# Patient Record
Sex: FEMALE | Race: WHITE | Employment: OTHER | URBAN - METROPOLITAN AREA
[De-identification: names, ages, dates, MRNs, and addresses within clinical notes are randomized per-mention and may not be internally consistent; named-entity substitution may affect disease eponyms.]

---

## 2017-01-01 ENCOUNTER — ANTI-COAG VISIT (OUTPATIENT)
Dept: CARDIOLOGY CLINIC | Age: 82
End: 2017-01-01

## 2017-01-01 ENCOUNTER — OFFICE VISIT (OUTPATIENT)
Dept: CARDIOLOGY CLINIC | Age: 82
End: 2017-01-01

## 2017-01-01 ENCOUNTER — OFFICE VISIT (OUTPATIENT)
Dept: ORTHOPEDIC SURGERY | Age: 82
End: 2017-01-01

## 2017-01-01 ENCOUNTER — TELEPHONE (OUTPATIENT)
Dept: CARDIOLOGY CLINIC | Age: 82
End: 2017-01-01

## 2017-01-01 ENCOUNTER — TELEPHONE (OUTPATIENT)
Dept: CARDIOLOGY | Age: 82
End: 2017-01-01

## 2017-01-01 VITALS — HEIGHT: 63 IN | WEIGHT: 220.02 LBS | BODY MASS INDEX: 38.98 KG/M2

## 2017-01-01 VITALS
OXYGEN SATURATION: 93 % | DIASTOLIC BLOOD PRESSURE: 44 MMHG | HEART RATE: 48 BPM | SYSTOLIC BLOOD PRESSURE: 167 MMHG | WEIGHT: 211 LBS | BODY MASS INDEX: 37.38 KG/M2

## 2017-01-01 VITALS
HEART RATE: 54 BPM | WEIGHT: 213.8 LBS | DIASTOLIC BLOOD PRESSURE: 72 MMHG | OXYGEN SATURATION: 96 % | BODY MASS INDEX: 37.88 KG/M2 | HEIGHT: 63 IN | SYSTOLIC BLOOD PRESSURE: 128 MMHG

## 2017-01-01 DIAGNOSIS — I10 ESSENTIAL HYPERTENSION: Chronic | ICD-10-CM

## 2017-01-01 DIAGNOSIS — I48.0 PAF (PAROXYSMAL ATRIAL FIBRILLATION) (HCC): ICD-10-CM

## 2017-01-01 DIAGNOSIS — I10 ESSENTIAL HYPERTENSION: ICD-10-CM

## 2017-01-01 DIAGNOSIS — E78.2 MIXED HYPERLIPIDEMIA: ICD-10-CM

## 2017-01-01 DIAGNOSIS — I25.10 CORONARY ARTERY DISEASE INVOLVING NATIVE CORONARY ARTERY OF NATIVE HEART WITHOUT ANGINA PECTORIS: Primary | Chronic | ICD-10-CM

## 2017-01-01 DIAGNOSIS — N18.6 ESRD (END STAGE RENAL DISEASE) (HCC): ICD-10-CM

## 2017-01-01 DIAGNOSIS — I48.0 PAF (PAROXYSMAL ATRIAL FIBRILLATION) (HCC): Primary | ICD-10-CM

## 2017-01-01 DIAGNOSIS — I25.10 CORONARY ARTERY DISEASE INVOLVING NATIVE CORONARY ARTERY OF NATIVE HEART WITHOUT ANGINA PECTORIS: Primary | ICD-10-CM

## 2017-01-01 DIAGNOSIS — M25.551 HIP PAIN, RIGHT: Primary | ICD-10-CM

## 2017-01-01 DIAGNOSIS — R00.2 PALPITATIONS: ICD-10-CM

## 2017-01-01 DIAGNOSIS — I73.9 PVD (PERIPHERAL VASCULAR DISEASE) (HCC): ICD-10-CM

## 2017-01-01 LAB
CHOLESTEROL, TOTAL: 102 MG/DL
CHOLESTEROL/HDL RATIO: 2.5
HDLC SERPL-MCNC: 41 MG/DL (ref 35–70)
INR BLD: 1.1
INR BLD: 1.6
INR BLD: 1.7
INR BLD: 2.1
INR BLD: 2.2
INR BLD: 4
LDL CHOLESTEROL CALCULATED: 44 MG/DL (ref 0–160)
TRIGL SERPL-MCNC: 85 MG/DL
VLDLC SERPL CALC-MCNC: 17 MG/DL

## 2017-01-01 PROCEDURE — G8400 PT W/DXA NO RESULTS DOC: HCPCS | Performed by: PHYSICIAN ASSISTANT

## 2017-01-01 PROCEDURE — G8484 FLU IMMUNIZE NO ADMIN: HCPCS | Performed by: PHYSICIAN ASSISTANT

## 2017-01-01 PROCEDURE — 1123F ACP DISCUSS/DSCN MKR DOCD: CPT | Performed by: INTERNAL MEDICINE

## 2017-01-01 PROCEDURE — G8598 ASA/ANTIPLAT THER USED: HCPCS | Performed by: PHYSICIAN ASSISTANT

## 2017-01-01 PROCEDURE — 1036F TOBACCO NON-USER: CPT | Performed by: PHYSICIAN ASSISTANT

## 2017-01-01 PROCEDURE — 1036F TOBACCO NON-USER: CPT | Performed by: INTERNAL MEDICINE

## 2017-01-01 PROCEDURE — G8400 PT W/DXA NO RESULTS DOC: HCPCS | Performed by: NURSE PRACTITIONER

## 2017-01-01 PROCEDURE — 1123F ACP DISCUSS/DSCN MKR DOCD: CPT | Performed by: PHYSICIAN ASSISTANT

## 2017-01-01 PROCEDURE — G8417 CALC BMI ABV UP PARAM F/U: HCPCS | Performed by: PHYSICIAN ASSISTANT

## 2017-01-01 PROCEDURE — 99213 OFFICE O/P EST LOW 20 MIN: CPT | Performed by: NURSE PRACTITIONER

## 2017-01-01 PROCEDURE — 99213 OFFICE O/P EST LOW 20 MIN: CPT | Performed by: PHYSICIAN ASSISTANT

## 2017-01-01 PROCEDURE — G8417 CALC BMI ABV UP PARAM F/U: HCPCS | Performed by: INTERNAL MEDICINE

## 2017-01-01 PROCEDURE — 99213 OFFICE O/P EST LOW 20 MIN: CPT | Performed by: INTERNAL MEDICINE

## 2017-01-01 PROCEDURE — G8598 ASA/ANTIPLAT THER USED: HCPCS | Performed by: INTERNAL MEDICINE

## 2017-01-01 PROCEDURE — 1090F PRES/ABSN URINE INCON ASSESS: CPT | Performed by: PHYSICIAN ASSISTANT

## 2017-01-01 PROCEDURE — G8427 DOCREV CUR MEDS BY ELIG CLIN: HCPCS | Performed by: NURSE PRACTITIONER

## 2017-01-01 PROCEDURE — 4040F PNEUMOC VAC/ADMIN/RCVD: CPT | Performed by: INTERNAL MEDICINE

## 2017-01-01 PROCEDURE — 4040F PNEUMOC VAC/ADMIN/RCVD: CPT | Performed by: PHYSICIAN ASSISTANT

## 2017-01-01 PROCEDURE — 1090F PRES/ABSN URINE INCON ASSESS: CPT | Performed by: INTERNAL MEDICINE

## 2017-01-01 PROCEDURE — 1111F DSCHRG MED/CURRENT MED MERGE: CPT | Performed by: NURSE PRACTITIONER

## 2017-01-01 PROCEDURE — 4040F PNEUMOC VAC/ADMIN/RCVD: CPT | Performed by: NURSE PRACTITIONER

## 2017-01-01 PROCEDURE — G8484 FLU IMMUNIZE NO ADMIN: HCPCS | Performed by: NURSE PRACTITIONER

## 2017-01-01 PROCEDURE — 1123F ACP DISCUSS/DSCN MKR DOCD: CPT | Performed by: NURSE PRACTITIONER

## 2017-01-01 PROCEDURE — G8427 DOCREV CUR MEDS BY ELIG CLIN: HCPCS | Performed by: PHYSICIAN ASSISTANT

## 2017-01-01 PROCEDURE — 1036F TOBACCO NON-USER: CPT | Performed by: NURSE PRACTITIONER

## 2017-01-01 PROCEDURE — 1111F DSCHRG MED/CURRENT MED MERGE: CPT | Performed by: PHYSICIAN ASSISTANT

## 2017-01-01 PROCEDURE — G8417 CALC BMI ABV UP PARAM F/U: HCPCS | Performed by: NURSE PRACTITIONER

## 2017-01-01 PROCEDURE — 73502 X-RAY EXAM HIP UNI 2-3 VIEWS: CPT | Performed by: PHYSICIAN ASSISTANT

## 2017-01-01 PROCEDURE — 1090F PRES/ABSN URINE INCON ASSESS: CPT | Performed by: NURSE PRACTITIONER

## 2017-01-01 PROCEDURE — G8400 PT W/DXA NO RESULTS DOC: HCPCS | Performed by: INTERNAL MEDICINE

## 2017-01-01 PROCEDURE — G8598 ASA/ANTIPLAT THER USED: HCPCS | Performed by: NURSE PRACTITIONER

## 2017-01-01 PROCEDURE — E0135 WALKER FOLDING ADJUST/FIXED: HCPCS | Performed by: PHYSICIAN ASSISTANT

## 2017-01-01 PROCEDURE — G8427 DOCREV CUR MEDS BY ELIG CLIN: HCPCS | Performed by: INTERNAL MEDICINE

## 2017-01-01 RX ORDER — CLOPIDOGREL BISULFATE 75 MG/1
75 TABLET ORAL DAILY
Qty: 30 TABLET | Refills: 0
Start: 2017-01-01 | End: 2017-01-01 | Stop reason: ALTCHOICE

## 2017-07-02 PROBLEM — R10.9 ABDOMINAL PAIN: Status: ACTIVE | Noted: 2017-01-01

## 2017-07-02 PROBLEM — K92.1 HEMATOCHEZIA: Status: ACTIVE | Noted: 2017-01-01

## 2017-07-02 PROBLEM — Z87.891 FORMER SMOKER: Chronic | Status: ACTIVE | Noted: 2017-01-01

## 2017-07-02 PROBLEM — A04.72 C. DIFFICILE COLITIS: Status: ACTIVE | Noted: 2017-01-01

## 2017-07-02 PROBLEM — R74.8 ELEVATED LIPASE: Status: ACTIVE | Noted: 2017-01-01

## 2017-07-02 PROBLEM — R16.1 SPLENIC MASS: Status: ACTIVE | Noted: 2017-01-01

## 2017-07-02 PROBLEM — I73.9 PVD (PERIPHERAL VASCULAR DISEASE) (HCC): Chronic | Status: ACTIVE | Noted: 2017-01-01

## 2017-09-21 PROBLEM — R00.2 PALPITATIONS: Status: ACTIVE | Noted: 2017-01-01

## 2017-10-30 PROBLEM — R07.9 CHEST PAIN: Status: ACTIVE | Noted: 2017-01-01

## 2017-11-07 NOTE — PROGRESS NOTES
11/07/17: INR is 1.7. Per protocol, dosing change is 7.5 mg today then resume 5 mg daily. Recheck 1 week(s).  Provider notified. ~ Benedict Burgess RN

## 2017-11-07 NOTE — TELEPHONE ENCOUNTER
Notified patient of this. She said her PCP wanted to know if she needs to stay on Plavix, warfarin and ASA?

## 2017-11-07 NOTE — TELEPHONE ENCOUNTER
Please call pt. CONFIRM SHE IS TAKING 5 MG DAILY. INR is 1.7. Per protocol, dosing change is 7.5 mg today then resume 5 mg daily. Recheck 1 week(s).  Provider notified. ~ Leyda Goodwin RN

## 2017-11-14 NOTE — PATIENT INSTRUCTIONS
1. Stop the Plavix, last dose on 12/3/17  2. Continue the baby aspirin and warfarin (Coumadin) indefinitely  3. No change in other heart medicines  4.  Follow up with Dr. Brittani Hdz in 3 months

## 2017-11-14 NOTE — PROGRESS NOTES
[facility]   Cardiac Evaluation    Primary Care Doctor:  Parveen Garcia MD    Chief Complaint   Patient presents with    Follow-Up from [address] [place] [address]     10/30/17 chest pain, 11/5/17 palpitaions, 11/13/17 palpitations    Edema     Lt leg    Discuss Medications     on 3 blood thinners. cut chin on 11/12/17 could not stop.  went to ED        History of Present Illness:   I had the pleasure of seeing Eyal Soliz in follow up for recent hospitalization. Eyal Soliz presented with chest pain and racing palpitations after dialysis. Cardiac troponin's elevated but unclear if related to ESRD. Stress test abnormal leading to left heart catheterization and PTCA of LAD. It was noted she has had PAF in the past though not evident during this admission. Given her high risk of CVA in AFib, anticoagulation with warfarin was recommended. This was initiated and will be followed at  to be called to our office for management. She has a hx of CAD and STEMI in August 2015 with PCI (BMS) to Diagonal artery, PAD with PTA's per Dr. Giovani Henriquez, HTN, HLD, ESRD on HD, DM2, anemia. Since discharge she has been in the ED 3 times for palpitations similar to original presentation though troponin's were down trending, chin laceration/ bleeding and lightheadedness/ weakness following dialysis which improved with IV fluids. Today she is doing fair. She still has lots of bruising to LLE but no hematoma, still sore. She denies any shortness of breath or chest pain. She does not tolerate dialysis very well, has more nausea. Her BP starts off high but then drops. Dr. Sadia Schumacher is her usual neprhologist.  She denies any palpitations since being on the bid metoprolol (was discharged with once daily but corrected during ED visit on 11/5/17). Her sleep is poor due to GI issues (diarrhea). Her appetite is only fair, eating less. She had constipation after discharge for about 3 days, better since. Jannice Prader describes symptoms including dyspnea, fatigue, edema but denies chest pain, palpitations, orthopnea, PND, early saiety, syncope. Past Medical History:   has a past medical history of Asthma; CAD (coronary artery disease); Chronic kidney disease (CKD) stage G4/A1, severely decreased glomerular filtration rate (GFR) between 15-29 mL/min/1.73 square meter and albuminuria creatinine ratio less than 30 mg/g ([facility] Diabetes mellitus ([facility] Glaucoma; Hypertension; Hypothyroid; Localized osteoarthrosis not specified whether primary or secondary, lower leg; Mixed hyperlipidemia; Pneumonia; Pneumonia; Psychiatric problem; Skin cancer; Trigger finger; and Unspecified diseases of blood and blood-forming organs. Surgical History:   has a past surgical history that includes Hemorrhoid surgery; Breast surgery; skin biopsy; Tonsillectomy; Endoscopy, colon, diagnostic; Colonoscopy; vascular surgery (Bilateral); eye surgery (Bilateral); knee surgery (Left, 4/16/13); Finger trigger release (Left); Cardiac catheterization (08/03/2015); Hammer toe surgery (Left); Colonoscopy (08/04/2016); and Coronary angioplasty with stent. Social History:   reports that she quit smoking about 20 years ago. She has a 30.00 pack-year smoking history. She has never used smokeless tobacco. She reports that she does not drink alcohol or use drugs. Family History:   Family History   Problem Relation Age of Onset    Arthritis Mother     Cancer Mother     High Blood Pressure Father     High Cholesterol Father     Diabetes Maternal Grandmother        Home Medications:  Prior to Admission medications    Medication Sig Start Date End Date Taking?  Authorizing Provider   metoprolol tartrate (LOPRESSOR) 50 MG tablet Take 1 tablet by mouth 2 times daily Change your dose to twice daily instead of daily 11/5/17  Yes Pino Brink MD   warfarin (COUMADIN) 5 MG tablet Take 1 tablet by mouth daily Recheck INR at dialysis on Monday 11/6/17. 11/3/17  Yes Paul Goodson NP   atorvastatin (LIPITOR) 80 MG tablet Take 1 tablet by mouth daily 11/3/17  Yes Paul Goodson NP   clopidogrel (PLAVIX) 75 MG tablet Take 1 tablet by mouth daily 11/3/17  Yes Paul Goodson NP   hyoscyamine  MCG TBCR extended release tablet Take by mouth every 4 hours as needed   Yes Historical Provider, MD   calcium carbonate (TUMS) 500 MG chewable tablet Take 1 tablet by mouth See Admin Instructions With each meal   Yes Historical Provider, MD   losartan (COZAAR) 25 MG tablet Take 25 mg by mouth nightly   Yes Historical Provider, MD   polyethylene glycol (MIRALAX) powder Take 17 g by mouth 2 times daily as needed (constipation) 9/8/16  Yes Maya Marsk MD   doxazosin (CARDURA) 2 MG tablet Take 4 mg by mouth nightly  9/23/15  Yes Historical Provider, MD   LEVEMIR 100 UNIT/ML injection vial 40 Units nightly  9/25/15  Yes Historical Provider, MD   B Complex-C-Folic Acid (REINA CAPS) 1 MG CAPS 1 capsule daily  9/7/15  Yes Historical Provider, MD   sevelamer (RENVELA) 0.8 G PACK packet Take 0.8 g by mouth 3 times daily (with meals) 8/6/15  Yes Sultana Mullins MD   furosemide (LASIX) 40 MG tablet Take 40 mg by mouth See Admin Instructions Take once daily on non-dialysis days   Yes Historical Provider, MD   clonidine (CATAPRES-TTS-3) 0.3 MG/24HR Place 1 patch onto the skin once a week thursdays   Yes Historical Provider, MD   aspirin 81 MG chewable tablet Take 81 mg by mouth daily. Yes Historical Provider, MD   brimonidine (ALPHAGAN P) 0.15 % ophthalmic solution   Place 1 drop into both eyes 2 times daily    Yes Historical Provider, MD   levothyroxine (SYNTHROID) 125 MCG tablet Take 125 mcg by mouth daily. Yes Historical Provider, MD        Allergies:  Acyclovir     Review of Systems:   · Constitutional: there has been no unanticipated weight loss. · Eyes: No vision changes  · ENT: No Headaches, no nasal congestion.  No mouth sores or sore arteriovenous groove. There is mild calcification throughout the left  circumflex and obtuse marginal.  There is moderate luminal irregularities  with 5%  to 10% stenosis. 5.  The right coronary artery is dominant. It has luminal calcifications as  well. It has luminal disease with stenosis less than 10%. 6.  Left ventricular end-diastolic pressure was 30.    7.  Left ventricular ejection fraction was not done due to significantly  elevated left ventricular end-diastolic pressure and we will obtain  echocardiogram.     PLAN:     1. Successful percutaneous intervention to a large diagonal 2 with a  proximal 100% lesion successfully treated with a bare metal stent. Residual  stenosis less than 10% with restoration of GASPER 3 flow. 2.  Significant peripheral arterial disease, as well as angiographic  calcification of all vessels between the coronary arteries, aorta, and  visualized portions of the left iliac, femoral, SFA, and profunda arteries. This is in line with patients end-stage renal disease. 3.  There is a previously-placed stent that is present in the proximal SFA. This is not completely visualized today but does show a proximal portion with  a 20% narrowing. 4.  Will continue patient on ReoPro for the next 12 hours. Continue Plavix  75 mg p.o. daily for 4 weeks without interruption, ideally for 1 year  following stenting procedure. 5.  Will need aggressive risk factor reduction and medical management. Will  need aggressive calcium control by Nephrology. Assessment:    1. Coronary artery disease involving native coronary artery of native heart without angina pectoris    2. PAF (paroxysmal atrial fibrillation) ([address]    3. Mixed hyperlipidemia    4. ESRD on HD MWF          Plan:   1. Stop the Plavix, last dose on 12/3/17  2. Continue the baby aspirin and warfarin (Coumadin) indefinitely  3. No change in other heart medicines  4.  Follow up with Dr. Juan M Van in 3 months       I appreciate the opportunity of cooperating in the care of this individual.    Reyna Mello CNP, 11/14/2017, 3:04 PM    QUALITY MEASURES  1. Tobacco Cessation Counseling: NA  2. Retake of BP if >140/90:   NA  3. Documentation to PCP/referring for new patient:  Sent to PCP at close of office visit  4. CAD patient on anti-platelet: Yes  5. CAD patient on STATIN therapy:  Yes  6.  Patient with CHF and aFib on anticoagulation:  Yes

## 2017-11-15 NOTE — PROGRESS NOTES
11/15/17: INR is 4.0. Per protocol, dosing change is hold today only. New regimen is 2.5 mg Sat and 5 mg all other days. Recheck 1 week(s).  Provider notified. ~ Jared Murdock RN

## 2017-11-15 NOTE — TELEPHONE ENCOUNTER
Ms Davion Rivas notified of the INR results and change in her Coumadin dose. She voiced her understanding.

## 2017-11-15 NOTE — TELEPHONE ENCOUNTER
Created telephone encounter. Per pt HIPAA form can not leave test results on machine. [facility] requesting pt to call the office for test results. Will relay INR results and instructions once pt calls back.

## 2017-11-17 PROBLEM — I50.32 CHRONIC DIASTOLIC CHF (CONGESTIVE HEART FAILURE) (HCC): Chronic | Status: ACTIVE | Noted: 2017-01-01

## 2017-11-17 PROBLEM — R16.1 SPLENIC MASS: Status: RESOLVED | Noted: 2017-01-01 | Resolved: 2017-01-01

## 2017-11-17 PROBLEM — K57.90 DIVERTICULOSIS: Chronic | Status: ACTIVE | Noted: 2017-01-01

## 2017-11-17 PROBLEM — R00.2 PALPITATIONS: Status: RESOLVED | Noted: 2017-01-01 | Resolved: 2017-01-01

## 2017-11-17 PROBLEM — R74.8 ELEVATED LIPASE: Status: RESOLVED | Noted: 2017-01-01 | Resolved: 2017-01-01

## 2017-11-17 PROBLEM — E66.9 OBESITY (BMI 30-39.9): Chronic | Status: ACTIVE | Noted: 2017-01-01

## 2017-11-17 PROBLEM — R07.9 CHEST PAIN: Status: RESOLVED | Noted: 2017-01-01 | Resolved: 2017-01-01

## 2017-11-17 PROBLEM — R19.7 DIARRHEA: Status: ACTIVE | Noted: 2017-01-01

## 2017-11-17 PROBLEM — A04.72 C. DIFFICILE COLITIS: Chronic | Status: ACTIVE | Noted: 2017-01-01

## 2017-11-17 PROBLEM — Z79.01 ANTICOAGULATED ON COUMADIN: Chronic | Status: ACTIVE | Noted: 2017-01-01

## 2017-11-17 PROBLEM — R10.9 ABDOMINAL PAIN: Status: RESOLVED | Noted: 2017-01-01 | Resolved: 2017-01-01

## 2017-11-20 NOTE — TELEPHONE ENCOUNTER
Please call the patient and schedule a 2-3 week HSFU with Rosette Kaiser CNP per Dr. Han Lopez. Thank you.

## 2017-11-29 NOTE — TELEPHONE ENCOUNTER
Per HIPAA form, can not leave a detailed message. [facility] requesting a call to the office.

## 2017-11-29 NOTE — TELEPHONE ENCOUNTER
Please call pt. INR is 1.1. Per discharge instructions on 11/20, she is to hold coumadin for 2 weeks due to lower GI bleed. She will restart coumadin on 12/04 at 5 mg daily. Recheck 12/08.  Provider notified. ~ Juan Greer RN     INR drawn at [facility]

## 2017-12-01 NOTE — PROGRESS NOTES
does have some mild to moderate groin pain. No significant buttocks pain. Range of Motion:  Range of motion of the hip is limited mostly with hip flexion and internal rotation. Tenderness present with logroll. Strength:  5 over 5 strength with hip flexion and extension     Special Tests:  Positive Erica's test.  Negative log roll maneuver. Negative Homans test.    Skin: There are no rashes, ulcerations or lesions. Gait: Slightly antalgic gait favoring the unaffected side. Reflex 2+ patellar    Additional Comments:   Decreased sensation of bilateral lower extremity is consistent with neuropathy. +1 pulses present. Additional Examinations:         Contralateral Exam: Examination of the left  hip reveals intact skin. The patient demonstrates full painless range of motion with regards to flexion, abduction, internal and external rotation. There is no tenderness about the greater trochanter. There is a negative straight leg raise against resistance. Strength is 5/5 throughout all planes. Lower Back: Examination of the lower back does not show any tenderness, deformity or injury. Range of motion is unremarkable. There is no gross instability. There are no rashes, ulcerations or lesions. Strength and tone are normal.    Radiology:     X-rays obtained and reviewed in office:  Views 2 views including AP pelvis and lateral  Location right  hip  Impression no fractures or malalignment identified. The femoral acetabular joint space appears well maintained. Significant arteriosclerosis are seen. Significant lumbar degenerative changes seen. Impression:  Encounter Diagnosis   Name Primary?     Hip pain, right Yes       Office Procedures:  Orders Placed This Encounter   Procedures    Hip 2-3 Vw W Pelvis Right    MRI Hip Right WO Contrast     Standing Status:   Future     Standing Expiration Date:   12/1/2018     Scheduling Instructions:      TBS @ [address] [place] [address]      [phone]

## 2017-12-12 NOTE — TELEPHONE ENCOUNTER
Attempted to call pt. No answer and no VM. INR is 2.2. Per protocol, no changes. Continue 2.5 mg Sat and 5 mg all other days. Recheck 1 week(s).  Provider notified. ~ Mathieu Aguero RN     INR drawn at [facility]

## 2017-12-12 NOTE — PROGRESS NOTES
12/12/17: INR is 2.2. Per protocol, no changes. Continue 2.5 mg Sat and 5 mg all other days. Recheck 1 week(s).  Provider notified. ~ Marisela Park RN     INR drawn at [facility]

## 2017-12-26 PROBLEM — I21.4 NSTEMI (NON-ST ELEVATED MYOCARDIAL INFARCTION) (HCC): Status: ACTIVE | Noted: 2017-01-01

## 2018-01-01 ENCOUNTER — TELEPHONE (OUTPATIENT)
Dept: CARDIOLOGY CLINIC | Age: 83
End: 2018-01-01

## 2018-01-01 ENCOUNTER — OFFICE VISIT (OUTPATIENT)
Dept: CARDIOLOGY CLINIC | Age: 83
End: 2018-01-01

## 2018-01-01 ENCOUNTER — NURSE ONLY (OUTPATIENT)
Dept: CARDIOLOGY CLINIC | Age: 83
End: 2018-01-01

## 2018-01-01 VITALS
WEIGHT: 200.8 LBS | SYSTOLIC BLOOD PRESSURE: 158 MMHG | DIASTOLIC BLOOD PRESSURE: 47 MMHG | HEIGHT: 63 IN | BODY MASS INDEX: 35.58 KG/M2 | HEART RATE: 61 BPM | OXYGEN SATURATION: 99 %

## 2018-01-01 VITALS
BODY MASS INDEX: 36.14 KG/M2 | SYSTOLIC BLOOD PRESSURE: 110 MMHG | DIASTOLIC BLOOD PRESSURE: 80 MMHG | OXYGEN SATURATION: 95 % | HEIGHT: 63 IN | WEIGHT: 204 LBS | HEART RATE: 43 BPM

## 2018-01-01 DIAGNOSIS — I44.0 FIRST DEGREE AV BLOCK: ICD-10-CM

## 2018-01-01 DIAGNOSIS — I48.0 PAF (PAROXYSMAL ATRIAL FIBRILLATION) (HCC): Chronic | ICD-10-CM

## 2018-01-01 DIAGNOSIS — N18.6 ESRD (END STAGE RENAL DISEASE) (HCC): Chronic | ICD-10-CM

## 2018-01-01 DIAGNOSIS — I25.10 CORONARY ARTERY DISEASE INVOLVING NATIVE CORONARY ARTERY OF NATIVE HEART WITHOUT ANGINA PECTORIS: Chronic | ICD-10-CM

## 2018-01-01 DIAGNOSIS — I50.21 ACUTE SYSTOLIC CONGESTIVE HEART FAILURE (HCC): ICD-10-CM

## 2018-01-01 DIAGNOSIS — I73.9 PAD (PERIPHERAL ARTERY DISEASE) (HCC): ICD-10-CM

## 2018-01-01 DIAGNOSIS — I25.10 CORONARY ARTERY DISEASE INVOLVING NATIVE CORONARY ARTERY OF NATIVE HEART WITHOUT ANGINA PECTORIS: Primary | Chronic | ICD-10-CM

## 2018-01-01 DIAGNOSIS — I21.4 NSTEMI (NON-ST ELEVATED MYOCARDIAL INFARCTION) (HCC): ICD-10-CM

## 2018-01-01 DIAGNOSIS — I48.0 PAF (PAROXYSMAL ATRIAL FIBRILLATION) (HCC): ICD-10-CM

## 2018-01-01 DIAGNOSIS — E78.2 MIXED HYPERLIPIDEMIA: Chronic | ICD-10-CM

## 2018-01-01 DIAGNOSIS — I44.1 AV BLOCK, MOBITZ 1: Primary | ICD-10-CM

## 2018-01-01 DIAGNOSIS — R00.1 BRADYCARDIA: Primary | ICD-10-CM

## 2018-01-01 DIAGNOSIS — R53.83 FATIGUE, UNSPECIFIED TYPE: ICD-10-CM

## 2018-01-01 DIAGNOSIS — I50.32 CHRONIC DIASTOLIC CHF (CONGESTIVE HEART FAILURE) (HCC): Chronic | ICD-10-CM

## 2018-01-01 DIAGNOSIS — I50.21 ACUTE SYSTOLIC CONGESTIVE HEART FAILURE (HCC): Primary | ICD-10-CM

## 2018-01-01 DIAGNOSIS — I15.0 RENOVASCULAR HYPERTENSION: Primary | Chronic | ICD-10-CM

## 2018-01-01 LAB
BASOPHILS ABSOLUTE: 0.1 /ΜL
BASOPHILS RELATIVE PERCENT: 0.7 %
EOSINOPHILS ABSOLUTE: 0.1 /ΜL
EOSINOPHILS RELATIVE PERCENT: 1 %
HCT VFR BLD CALC: 29.5 % (ref 36–46)
HEMOGLOBIN: 9.8 G/DL (ref 12–16)
LYMPHOCYTES ABSOLUTE: 1.2 /ΜL
LYMPHOCYTES RELATIVE PERCENT: 16 %
MCH RBC QN AUTO: 29.3 PG
MCHC RBC AUTO-ENTMCNC: 33.3 G/DL
MCV RBC AUTO: 88.2 FL
MONOCYTES ABSOLUTE: 0.5 /ΜL
MONOCYTES RELATIVE PERCENT: 7.4 %
NEUTROPHILS ABSOLUTE: 5.4 /ΜL
NEUTROPHILS RELATIVE PERCENT: 74.9 %
PLATELET # BLD: 232 K/ΜL
PMV BLD AUTO: 8.8 FL
POTASSIUM (K+): 5.2
POTASSIUM (K+): 6
RBC # BLD: 3.35 10^6/ΜL
WBC # BLD: 7.2 10^3/ML

## 2018-01-01 PROCEDURE — G8598 ASA/ANTIPLAT THER USED: HCPCS | Performed by: INTERNAL MEDICINE

## 2018-01-01 PROCEDURE — 1036F TOBACCO NON-USER: CPT | Performed by: INTERNAL MEDICINE

## 2018-01-01 PROCEDURE — 4040F PNEUMOC VAC/ADMIN/RCVD: CPT | Performed by: INTERNAL MEDICINE

## 2018-01-01 PROCEDURE — 99215 OFFICE O/P EST HI 40 MIN: CPT | Performed by: INTERNAL MEDICINE

## 2018-01-01 PROCEDURE — 99214 OFFICE O/P EST MOD 30 MIN: CPT | Performed by: INTERNAL MEDICINE

## 2018-01-01 PROCEDURE — G8427 DOCREV CUR MEDS BY ELIG CLIN: HCPCS | Performed by: INTERNAL MEDICINE

## 2018-01-01 PROCEDURE — 1111F DSCHRG MED/CURRENT MED MERGE: CPT | Performed by: INTERNAL MEDICINE

## 2018-01-01 PROCEDURE — G8400 PT W/DXA NO RESULTS DOC: HCPCS | Performed by: INTERNAL MEDICINE

## 2018-01-01 PROCEDURE — 93000 ELECTROCARDIOGRAM COMPLETE: CPT | Performed by: INTERNAL MEDICINE

## 2018-01-01 PROCEDURE — 1123F ACP DISCUSS/DSCN MKR DOCD: CPT | Performed by: INTERNAL MEDICINE

## 2018-01-01 PROCEDURE — 93225 XTRNL ECG REC<48 HRS REC: CPT | Performed by: INTERNAL MEDICINE

## 2018-01-01 PROCEDURE — 1090F PRES/ABSN URINE INCON ASSESS: CPT | Performed by: INTERNAL MEDICINE

## 2018-01-01 PROCEDURE — G8417 CALC BMI ABV UP PARAM F/U: HCPCS | Performed by: INTERNAL MEDICINE

## 2018-01-01 PROCEDURE — 93227 XTRNL ECG REC<48 HR R&I: CPT | Performed by: INTERNAL MEDICINE

## 2018-01-01 PROCEDURE — G8484 FLU IMMUNIZE NO ADMIN: HCPCS | Performed by: INTERNAL MEDICINE

## 2018-01-01 RX ORDER — OXYCODONE HYDROCHLORIDE AND ACETAMINOPHEN 5; 325 MG/1; MG/1
1 TABLET ORAL EVERY 8 HOURS PRN
COMMUNITY

## 2018-01-01 RX ORDER — LOSARTAN POTASSIUM 100 MG/1
100 TABLET ORAL DAILY
Qty: 30 TABLET | Refills: 2 | Status: SHIPPED | OUTPATIENT
Start: 2018-01-01

## 2018-01-01 RX ORDER — VANCOMYCIN HYDROCHLORIDE 125 MG/1
CAPSULE ORAL
COMMUNITY
Start: 2018-01-01

## 2018-01-01 RX ORDER — CHOLESTYRAMINE 4 G/9G
POWDER, FOR SUSPENSION ORAL
Refills: 1 | COMMUNITY
Start: 2018-01-01 | End: 2018-01-01

## 2018-01-01 RX ORDER — HYOSCYAMINE SULFATE 0.125 MG
125 TABLET ORAL
COMMUNITY

## 2018-01-24 NOTE — TELEPHONE ENCOUNTER
I would first try to increase the losartan from 50 mg to 100 mg daily.    Thanks, [employer]

## 2018-02-01 NOTE — PROGRESS NOTES
[address] [facility]   Cardiovascular follow up     PATIENT: Devan Sharma: 2018  MRN: [id number]  CSN: [id number]  : 1935      Primary Care Doctor: Pasquale Almazan MD  Reason for evaluation:   Follow-Up from Hospital (S/P stent)      Subjective:   History of present illness on initial date of evaluation:   Ricardo De La Vega is a 81 y.o. patient who presents for hospital follow up for CAD, STEMI, and HLD. She presented to the hospital on 08/03/15 with complaints of CP. Pt was at dialysis and 15 min before was completed started to have chest tightness. No radiation, +SOB and nausea. Was brought to ED by . Initial ECG with ST depression but f/u ECG with ST elevation in lateral leads. Cath lab activated. She underwent a cardiac cath on 08/17/15 which showed CAD S/P STEMI with bare metal stent to Diagonal. Very calcified vessels. Today she is here for follow up for CAD, S/P STEMI in , and new palpitations. She states on labor day she started to have palpitations after her HD. She states she went to the ER that day. She signed out AMA. She states the palpitations occur occasionally after her HD, usually resolve on their own. Does not occur on non HD days. She has no chest pain, SOB, or syncope. She states she has pain in her legs that is not new, she follows with Dr. Annetta Pruitt for this. She is able to walk, but has to stop due to cramps. Today she is here for follow up S/P left heart cath 2017. She states she has been feeling well. She does still have bleeding from hemorrhoids that is mild. She states her chest pain is gone. She is tolerating her medications. She denies chest pain, SOB, dizziness, or syncope.      Patient Active Problem List   Diagnosis    Chronic normocytic anemia    DM2/IDDM    Elevated troponin    HTN (hypertension)    Bronchial asthma    Coronary artery disease    ESRD on HD MWF    PVD s/p b/l LE stents    Hx C. difficile colitis (July hours. Continue Plavix  75 mg p.o. daily for 4 weeks without interruption, ideally for 1 year  following stenting procedure. 5.  Will need aggressive risk factor reduction and medical management. Will  need aggressive calcium control by Nephrology. VASCULAR/OTHER IMAGING:      Assessment and Plan   Niki Dunn is a 81 y.o. female who presents today for the following problems:      1. CAD:               - PCI of LAD 11/2017 after failed POBA earlier in the year              - s/p PCI D1 2015 for STEMI    2. PAD: following with Dr. Armin Parrish. Significant BLE arterial disease with multiple PTA hx.   - + claudication    3. HTN: managed by nephrology, controlled   -on HD and lasix     4. PAF: on coumadin   - Holding coumadin due to recent severe GIB 12/2017    5. HLD: controlled  6. DM: A1c 5.8  7. ESRD on RRT  8. Anemia with Hx of GIB on DAPT 11/2017, H/H now up to 10.1/35.2              - s/p EGD and duodenal AVM endoclip on 12/29/2017              - s/p EGD and colonoscopy on 12/30/2017 (severe esophagitis, non bleeding gastric ulcer, diverticulosis, and colitis noted)    9. Bradycardia: TSH normal 12/27/2017   - on synthroid, followed by Dr. Rakel Delaney:  1. Cont Plavix monotherapy for now   - HOLDING ASA nad coumadin for severe GIB bleeding 12/2017  2. HOLDING metoprolol for bradycardia  3. Symptoms and ECG concerning for possible chronotropic incompatance   - will get GXT-ECG to eval  4. D/w pt ECG and concern for electrical slowing and Mobitz 1- educated on warning symptoms and possibility of needed pacemaker   - will f/u in 2 months        3. Have lipid panel drawn at HD  4. Pt is high risk given CAD/PAD and dialysis, Follow up with me in 1 year or prn symptoms         QUALITY MEASURES  1. Tobacco Cessation Counseling: N/A Former  2. Retake of BP if >140/90:   Yes  No  N/A  3. Documentation to PCP/referring for new patient:  Sent to PCP at close of office visit  4.  CAD patient on anti-platelet:

## 2018-04-03 PROBLEM — I73.9 PAD (PERIPHERAL ARTERY DISEASE) (HCC): Status: ACTIVE | Noted: 2018-01-01
